# Patient Record
Sex: MALE | Race: BLACK OR AFRICAN AMERICAN | NOT HISPANIC OR LATINO | URBAN - METROPOLITAN AREA
[De-identification: names, ages, dates, MRNs, and addresses within clinical notes are randomized per-mention and may not be internally consistent; named-entity substitution may affect disease eponyms.]

---

## 2024-04-27 ENCOUNTER — LAB REQUISITION (OUTPATIENT)
Dept: LAB | Facility: HOSPITAL | Age: 84
End: 2024-04-27

## 2024-04-27 DIAGNOSIS — R53.1 WEAKNESS: ICD-10-CM

## 2024-04-27 DIAGNOSIS — I48.20 CHRONIC ATRIAL FIBRILLATION, UNSPECIFIED (MULTI): ICD-10-CM

## 2024-04-27 PROCEDURE — 85610 PROTHROMBIN TIME: CPT

## 2024-04-28 LAB
INR PPP: 1.8 (ref 0.9–1.1)
PROTHROMBIN TIME: 20.7 SECONDS (ref 9.8–12.8)

## 2025-02-10 ENCOUNTER — NURSING HOME VISIT (OUTPATIENT)
Dept: POST ACUTE CARE | Facility: EXTERNAL LOCATION | Age: 85
End: 2025-02-10
Payer: MEDICARE

## 2025-02-10 DIAGNOSIS — I50.9 CHRONIC CONGESTIVE HEART FAILURE, UNSPECIFIED HEART FAILURE TYPE: ICD-10-CM

## 2025-02-10 DIAGNOSIS — R53.81 PHYSICAL DECONDITIONING: ICD-10-CM

## 2025-02-10 DIAGNOSIS — I48.11 LONGSTANDING PERSISTENT ATRIAL FIBRILLATION (MULTI): ICD-10-CM

## 2025-02-10 DIAGNOSIS — L89.153 PRESSURE INJURY OF SACRAL REGION, STAGE 3 (MULTI): Primary | ICD-10-CM

## 2025-02-10 DIAGNOSIS — I25.10 CORONARY ARTERY DISEASE INVOLVING NATIVE CORONARY ARTERY OF NATIVE HEART WITHOUT ANGINA PECTORIS: ICD-10-CM

## 2025-02-10 DIAGNOSIS — A04.72 C. DIFFICILE DIARRHEA: ICD-10-CM

## 2025-02-10 DIAGNOSIS — S88.112D BELOW-KNEE AMPUTATION OF LEFT LOWER EXTREMITY, SUBSEQUENT ENCOUNTER (MULTI): ICD-10-CM

## 2025-02-10 DIAGNOSIS — I10 BENIGN ESSENTIAL HTN: ICD-10-CM

## 2025-02-10 DIAGNOSIS — Z79.4 TYPE 2 DIABETES MELLITUS WITHOUT COMPLICATION, WITH LONG-TERM CURRENT USE OF INSULIN (MULTI): ICD-10-CM

## 2025-02-10 DIAGNOSIS — E11.9 TYPE 2 DIABETES MELLITUS WITHOUT COMPLICATION, WITH LONG-TERM CURRENT USE OF INSULIN (MULTI): ICD-10-CM

## 2025-02-10 DIAGNOSIS — I25.2 HX OF MYOCARDIAL INFARCTION: ICD-10-CM

## 2025-02-10 PROCEDURE — 99497 ADVNCD CARE PLAN 30 MIN: CPT | Performed by: INTERNAL MEDICINE

## 2025-02-10 PROCEDURE — 99306 1ST NF CARE HIGH MDM 50: CPT | Performed by: INTERNAL MEDICINE

## 2025-02-10 NOTE — LETTER
Patient: Yonas Gao  : 1940    Encounter Date: 02/10/2025    Nursing Home  History & Physical Visit    Name: Yonas Gao  MRN: [id number]  YOB: 1940  Date of Service: 2/10/2025      History of Present Illness  Pt was seen , available records reviewed. Hx from chart and patient . He was admitted to hosp with worsening in his sacral wound and for his recent L BKA. He had L BKA due to infection in big toe ( as per pt) and has staples on . He had debribdment of stage 3 pressure ulcer in sacral area , orders were given and now he Is here for further management .   Hx of HTN , CHF CAD s/p multiple MI in past.   Lives at home . Feels somewhat down due to recent BKA .     Patient denies any shortness of breath, PND, orthopnea, chest pain , palpitation, syncope or edema in legs  Patient denies any weakness in extremities.. Denies any headache, visual symptoms , speech problems or  tremors . No TIA or stroke like symptoms..        Review of Systems  REVIEW OF SYSTEMS:   All other systems have been reviewed and are negative in relation to patient's complaint and other than what is mentioned in History of present illness.     Vital Signs  Temperature  97.8 °F  02/10/2025 01:13  Pulse  89 per minute  02/10/2025 01:13  Respirations  18 per minute  02/10/2025 01:13  Blood Pressure  133 / 68 mmHg  02/10/2025 01:13  O2 Saturation  97 %  02/10/2025 01:13  Physical Exam  Vitals noted   Not in acute distress  Conj Pink, No icterus  Neck: No Cervical LN enlargement, No Thyroid enlargement   Lungs: good air entry bilaterally, no rales or rhonchi  CVS: S1 S2 + , no S3. No loud heart murmur heard.   Abdomen: Soft, non tender , BS +. no organomegaly , no CVA tenderness  CNS: Pt is alert, moving all 4 extremities. no motor weakness or abnormal movements noted on gross examination.  There is a large, clean looking deep scaral wound in sacral area . No foul smelling or active drainage.   No spine  tenderness  Extremities:  R leg -No edema, No calf tenderness, Amanda's sign negative. L leg has sleeve with L BKA and has staples on. No bleeding or drainage from surgical site.    Assessment/Plan:    1. Pressure injury of sacral region, stage 3 (Multi) -wound care. Had debridement at hosp   2. Below-knee amputation of left lower extremity, subsequent encounter (Multi) - will need f/U with surgeon for staples and further care   3. Longstanding persistent atrial fibrillation (Multi) -hx of. -rate controlled . On coumadin   4. Type 2 diabetes mellitus without complication, with long-term current use of insulin (Multi) -hx of.    5. Coronary artery disease involving native coronary artery of native heart without angina pectoris    6. Chronic congestive heart failure, unspecified heart failure type - stavle   7. Benign essential HTN -hx of. controlled     8. Hx of myocardial infarction    9. C. difficile diarrhea -at hosp. No active diarrhea issue    10. Physical deconditioning -will get therapy        Plan:     Available medical records reviewed . Patient was examined and detailed History and physical done . All questions answered to patient's satisfaction . Total time for chart reviewing , detailed examination and coordinating care with patient was > 35 minutes.   During visit today , I asked patient as well as looked in records  in regards to advanced directive , POA etc. Pt wants to be DNR CCA . He doesnot have any living will currently as per him.  Questions related to it answered to pt's satisfaction .    Patient is doing well.   Continue OT/PT Rehab.   Pain control - he is on oxycodone  F/U with surgeon and wound care   Current medications are effective. advised to continue current medications.  Will continue to follow   Patient felt satisfied with plan  .      Electronically Signed By: García Saul MD   2/10/25 11:17 AM

## 2025-02-10 NOTE — PROGRESS NOTES
Nursing Home  History & Physical Visit    Name: Yonas Gao  MRN: [id number]  YOB: 1940  Date of Service: 2/10/2025      History of Present Illness  Pt was seen , available records reviewed. Hx from chart and patient . He was admitted to hosp with worsening in his sacral wound and for his recent L BKA. He had L BKA due to infection in big toe ( as per pt) and has staples on . He had debribdment of stage 3 pressure ulcer in sacral area , orders were given and now he Is here for further management .   Hx of HTN , CHF CAD s/p multiple MI in past.   Lives at home . Feels somewhat down due to recent BKA .     Patient denies any shortness of breath, PND, orthopnea, chest pain , palpitation, syncope or edema in legs  Patient denies any weakness in extremities.. Denies any headache, visual symptoms , speech problems or  tremors . No TIA or stroke like symptoms..        Review of Systems  REVIEW OF SYSTEMS:   All other systems have been reviewed and are negative in relation to patient's complaint and other than what is mentioned in History of present illness.     Vital Signs  Temperature  97.8 °F  02/10/2025 01:13  Pulse  89 per minute  02/10/2025 01:13  Respirations  18 per minute  02/10/2025 01:13  Blood Pressure  133 / 68 mmHg  02/10/2025 01:13  O2 Saturation  97 %  02/10/2025 01:13  Physical Exam  Vitals noted   Not in acute distress  Conj Pink, No icterus  Neck: No Cervical LN enlargement, No Thyroid enlargement   Lungs: good air entry bilaterally, no rales or rhonchi  CVS: S1 S2 + ,? Extrabeats ,  no S3. No loud heart murmur heard.   Abdomen: Soft, non tender , BS +. no organomegaly , no CVA tenderness  CNS: Pt is alert, moving all 4 extremities. no motor weakness or abnormal movements noted on gross examination.  There is a large, clean looking deep scaral wound in sacral area . No foul smelling or active drainage.   No spine tenderness  Extremities:  R leg -No edema, No calf tenderness, Amanda's sign  negative. L leg has sleeve with L BKA and has staples on. No bleeding or drainage from surgical site.    Assessment/Plan:    1. Pressure injury of sacral region, stage 3 (Multi) -wound care. Had debridement at hosp   2. Below-knee amputation of left lower extremity, subsequent encounter (Multi) - will need f/U with surgeon for staples and further care   3. Longstanding persistent atrial fibrillation (Multi) -hx of. -rate controlled . On coumadin   4. Type 2 diabetes mellitus without complication, with long-term current use of insulin (Multi) -hx of.    5. Coronary artery disease involving native coronary artery of native heart without angina pectoris    6. Chronic congestive heart failure, unspecified heart failure type - stavle   7. Benign essential HTN -hx of. controlled     8. Hx of myocardial infarction    9. C. difficile diarrhea -at hosp. No active diarrhea issue    10. Physical deconditioning -will get therapy        Plan:     Available medical records reviewed . Patient was examined and detailed History and physical done . All questions answered to patient's satisfaction . Total time for chart reviewing , detailed examination and coordinating care with patient was > 35 minutes.   During visit today , I asked patient as well as looked in records  in regards to advanced directive , POA etc. Pt wants to be DNR CCA . He doesnot have any living will currently as per him.  Questions related to it answered to pt's satisfaction .    Patient is doing well.   Continue OT/PT Rehab.   Pain control - he is on oxycodone  F/U with surgeon and wound care   Current medications are effective. advised to continue current medications.  Will continue to follow   Patient felt satisfied with plan  .

## 2025-02-17 ENCOUNTER — NURSING HOME VISIT (OUTPATIENT)
Dept: POST ACUTE CARE | Facility: EXTERNAL LOCATION | Age: 85
End: 2025-02-17
Payer: MEDICARE

## 2025-02-17 DIAGNOSIS — I48.11 LONGSTANDING PERSISTENT ATRIAL FIBRILLATION (MULTI): ICD-10-CM

## 2025-02-17 DIAGNOSIS — I10 BENIGN ESSENTIAL HTN: ICD-10-CM

## 2025-02-17 DIAGNOSIS — S88.112D BELOW-KNEE AMPUTATION OF LEFT LOWER EXTREMITY, SUBSEQUENT ENCOUNTER (MULTI): ICD-10-CM

## 2025-02-17 DIAGNOSIS — L89.153 PRESSURE INJURY OF SACRAL REGION, STAGE 3 (MULTI): Primary | ICD-10-CM

## 2025-02-17 DIAGNOSIS — I50.9 CHRONIC CONGESTIVE HEART FAILURE, UNSPECIFIED HEART FAILURE TYPE: ICD-10-CM

## 2025-02-17 PROCEDURE — 99308 SBSQ NF CARE LOW MDM 20: CPT | Performed by: INTERNAL MEDICINE

## 2025-02-17 NOTE — LETTER
Patient: Yonas Gao  : 1940    Encounter Date: 2025        Nursing home follow up visit  Name: Yonas Gao  MRN: [id number]  YOB: 1940  Date of Service: 2025      Pt was evaluated during nursing home visit today  Patient is doing OK. Participating in therapy well  No sob, cp, PND, orthopnea  Eating ok, sleeping ok   Moving BM ok.   Tolerating medications well    Objective :  Vitals were noted, was examined in therapy room today   Patient is not any acute distress  Conjunctiva- Pink, no icterus   No cervical LN enlargement   Lungs clear, s1s2 +   L BKA with dressing over it.   No edema , No calf tenderness     Assessment:    1. Pressure injury of sacral region, stage 3 (Multi) -getting treatment. Plan as per wound care provider    2. Below-knee amputation of left lower extremity, subsequent encounter (Multi) -dressing , pain control. Gets phantom pain as per pt at times.    3. Longstanding persistent atrial fibrillation (Multi) -hx of.    4. Chronic congestive heart failure, unspecified heart failure type -compensated   5. Benign essential HTN -hx of. controlled          Plan:  Patient is doing well.   Continue OT/PT Rehab.   Pain control  I have reviewed all active medications patient is currently on . Questions related to medication answered to patient's satisfaction.  Current medications are effective. advised to continue current medications.  Will continue to follow   Patient felt satisfied with plan            Electronically Signed By: García Saul MD   25 11:23 AM

## 2025-02-17 NOTE — PROGRESS NOTES
Nursing home follow up visit  Name: Yonas Gao  MRN: [id number]  YOB: 1940  Date of Service: 2/17/2025      Pt was evaluated during nursing home visit today  Patient is doing OK. Participating in therapy well  No sob, cp, PND, orthopnea  Eating ok, sleeping ok   Moving BM ok.   Tolerating medications well    Objective :  Vitals were noted, was examined in therapy room today   Patient is not any acute distress  Conjunctiva- Pink, no icterus   No cervical LN enlargement   Lungs clear, s1s2 +   L BKA with dressing over it.   No edema , No calf tenderness     Assessment:    1. Pressure injury of sacral region, stage 3 (Multi) -getting treatment. Plan as per wound care provider    2. Below-knee amputation of left lower extremity, subsequent encounter (Multi) -dressing , pain control. Gets phantom pain as per pt at times.    3. Longstanding persistent atrial fibrillation (Multi) -hx of.    4. Chronic congestive heart failure, unspecified heart failure type -compensated   5. Benign essential HTN -hx of. controlled          Plan:  Patient is doing well.   Continue OT/PT Rehab.   Pain control  I have reviewed all active medications patient is currently on . Questions related to medication answered to patient's satisfaction.  Current medications are effective. advised to continue current medications.  Will continue to follow   Patient felt satisfied with plan

## 2025-02-24 ENCOUNTER — NURSING HOME VISIT (OUTPATIENT)
Dept: POST ACUTE CARE | Facility: EXTERNAL LOCATION | Age: 85
End: 2025-02-24
Payer: MEDICARE

## 2025-02-24 DIAGNOSIS — L89.153 PRESSURE INJURY OF SACRAL REGION, STAGE 3 (MULTI): ICD-10-CM

## 2025-02-24 DIAGNOSIS — I10 BENIGN ESSENTIAL HTN: ICD-10-CM

## 2025-02-24 DIAGNOSIS — S88.112D BELOW-KNEE AMPUTATION OF LEFT LOWER EXTREMITY, SUBSEQUENT ENCOUNTER (MULTI): ICD-10-CM

## 2025-02-24 DIAGNOSIS — I50.9 CHRONIC CONGESTIVE HEART FAILURE, UNSPECIFIED HEART FAILURE TYPE: Primary | ICD-10-CM

## 2025-02-24 PROCEDURE — 99308 SBSQ NF CARE LOW MDM 20: CPT | Performed by: INTERNAL MEDICINE

## 2025-02-24 NOTE — PROGRESS NOTES
Nursing home follow up visit  Name: Yonas Gao  MRN: [id number]  YOB: 1940  Date of Service: 2/24/2025      Pt was evaluated during nursing home visit today  Patient is doing OK. Participating in therapy well  No sob, cp, PND, orthopnea  Eating ok, sleeping ok   Moving BM ok.   Tolerating medications well    Objective :  Temperature  97.9 °F  02/11/2025 22:01  Pulse  82 per minute  02/11/2025 22:01  Respirations  18 per minute  02/11/2025 22:01  Blood Pressure  122 / 78 mmHg  02/11/2025 22:01  O2 Saturation  99 %  02/11/2025 22:01  Blood Sugar  170 mg/dL / Low  02/10/2025 22:52  Weight  233 lbs / Routine BMI: 29.91  02/21/2025 14:24  Vitals were noted  Patient is not any acute distress  Conjunctiva- Pink, no icterus   No cervical LN enlargement   Lungs clear, s1s2 +   No edema , No calf tenderness s/P L BKA with sleeve on     Assessment:    1. Chronic congestive heart failure, unspecified heart failure type -compensated   2. Pressure injury of sacral region, stage 3 (Multi) -hx of. Wound care   3. Below-knee amputation of left lower extremity, subsequent encounter (Multi)    4. Benign essential HTN         Plan:  Patient is doing well.   Continue OT/PT Rehab.   I have reviewed all active medications patient is currently on . Questions related to medication answered to patient's satisfaction.  Current medications are effective. advised to continue current medications.  Will continue to follow   Patient felt satisfied with plan

## 2025-02-24 NOTE — LETTER
Patient: Yonas Gao  : 1940    Encounter Date: 2025        Nursing home follow up visit  Name: Yonas Gao  MRN: [id number]  YOB: 1940  Date of Service: 2025      Pt was evaluated during nursing home visit today  Patient is doing OK. Participating in therapy well  No sob, cp, PND, orthopnea  Eating ok, sleeping ok   Moving BM ok.   Tolerating medications well    Objective :  Temperature  97.9 °F  2025 22:01  Pulse  82 per minute  2025 22:01  Respirations  18 per minute  2025 22:01  Blood Pressure  122 / 78 mmHg  2025 22:01  O2 Saturation  99 %  2025 22:01  Blood Sugar  170 mg/dL / Low  02/10/2025 22:52  Weight  233 lbs / Routine BMI: 29.91  2025 14:24  Vitals were noted  Patient is not any acute distress  Conjunctiva- Pink, no icterus   No cervical LN enlargement   Lungs clear, s1s2 +   No edema , No calf tenderness s/P L BKA with sleeve on     Assessment:    1. Chronic congestive heart failure, unspecified heart failure type -compensated   2. Pressure injury of sacral region, stage 3 (Multi) -hx of. Wound care   3. Below-knee amputation of left lower extremity, subsequent encounter (Multi)    4. Benign essential HTN         Plan:  Patient is doing well.   Continue OT/PT Rehab.   I have reviewed all active medications patient is currently on . Questions related to medication answered to patient's satisfaction.  Current medications are effective. advised to continue current medications.  Will continue to follow   Patient felt satisfied with plan            Electronically Signed By: García Saul MD   25 10:50 AM

## 2025-03-03 ENCOUNTER — NURSING HOME VISIT (OUTPATIENT)
Dept: POST ACUTE CARE | Facility: EXTERNAL LOCATION | Age: 85
End: 2025-03-03
Payer: MEDICARE

## 2025-03-03 DIAGNOSIS — A04.72 C. DIFFICILE DIARRHEA: ICD-10-CM

## 2025-03-03 DIAGNOSIS — S88.112D BELOW-KNEE AMPUTATION OF LEFT LOWER EXTREMITY, SUBSEQUENT ENCOUNTER (MULTI): ICD-10-CM

## 2025-03-03 DIAGNOSIS — Z75.8 DISCHARGE PLANNING ISSUES: ICD-10-CM

## 2025-03-03 DIAGNOSIS — I10 BENIGN ESSENTIAL HTN: ICD-10-CM

## 2025-03-03 DIAGNOSIS — I25.2 HX OF MYOCARDIAL INFARCTION: ICD-10-CM

## 2025-03-03 DIAGNOSIS — Z79.4 TYPE 2 DIABETES MELLITUS WITHOUT COMPLICATION, WITH LONG-TERM CURRENT USE OF INSULIN (MULTI): ICD-10-CM

## 2025-03-03 DIAGNOSIS — E11.9 TYPE 2 DIABETES MELLITUS WITHOUT COMPLICATION, WITH LONG-TERM CURRENT USE OF INSULIN (MULTI): ICD-10-CM

## 2025-03-03 DIAGNOSIS — I50.9 CHRONIC CONGESTIVE HEART FAILURE, UNSPECIFIED HEART FAILURE TYPE: Primary | ICD-10-CM

## 2025-03-03 DIAGNOSIS — L89.153 PRESSURE INJURY OF SACRAL REGION, STAGE 3 (MULTI): ICD-10-CM

## 2025-03-03 NOTE — PROGRESS NOTES
Nursing home follow up visit  Name: Yonas Gao  MRN: [id number]  YOB: 1940  Date of Service: 3/3/2025      Pt was evaluated during nursing home visit today  Patient is doing OK. Participating in therapy well. He was able to stand for 5 min as per him.   No sob, cp, PND, orthopnea  Eating ok, sleeping ok   Moving BM ok. No more diarrhea   Tolerating medications well    Objective :  Temperature  98.2 °F  03/01/2025 19:52  Pulse  62 per minute  03/01/2025 19:52  Respirations  16 per minute  03/01/2025 19:52  Blood Pressure  139 / 84 mmHg  03/01/2025 19:52  O2 Saturation  99 %  03/01/2025 19:52  Blood Sugar  170 mg/dL / Low  02/10/2025 22:52  Weight  236.0 lbs / Routine BMI: 30.3  02/25/2025 12:02  Vitals were noted  Patient is not any acute distress  Conjunctiva- Pink, no icterus   No cervical LN enlargement   Lungs clear, s1s2 +   No edema , No calf tenderness s/P L BKA     Assessment:    1. Chronic congestive heart failure, unspecified heart failure type -compensated   2. Pressure injury of sacral region, stage 3 (Multi) -getting dressing as per wound care provider's instruction    3. Below-knee amputation of left lower extremity, subsequent encounter (Multi)    4. Benign essential HTN -hx of. controlled     5. Type 2 diabetes mellitus without complication, with long-term current use o  f insulin (Multi) doing ok    6. Discharge planning issues    7. Hx of myocardial infarction -hx of.    8. C. difficile diarrhea -hx of. Doing ok now         Plan:  Patient is doing well.   Patient was discussed about her medical issues, medications questions as well as follow up with her doctor upon discharge. All questions were answered to her satisfaction . She is ready for discharge soon in my clinical judgement.. Total time discussing it along with co ordination of care was > 25 min. He will be moved to assisted living place in [facility].     I have reviewed all active medications patient is currently on .  Questions related to medication answered to patient's satisfaction.  Current medications are effective. advised to continue current medications.  Will continue to follow   Patient felt satisfied with plan

## 2025-03-03 NOTE — LETTER
Patient: Yonas Gao  : 1940    Encounter Date: 2025        Nursing home follow up visit  Name: Yonas Gao  MRN: [id number]  YOB: 1940  Date of Service: 3/3/2025      Pt was evaluated during nursing home visit today  Patient is doing OK. Participating in therapy well. He was able to stand for 5 min as per him.   No sob, cp, PND, orthopnea  Eating ok, sleeping ok   Moving BM ok. No more diarrhea   Tolerating medications well    Objective :  Temperature  98.2 °F  2025 19:52  Pulse  62 per minute  2025 19:52  Respirations  16 per minute  2025 19:52  Blood Pressure  139 / 84 mmHg  2025 19:52  O2 Saturation  99 %  2025 19:52  Blood Sugar  170 mg/dL / Low  02/10/2025 22:52  Weight  236.0 lbs / Routine BMI: 30.3  2025 12:02  Vitals were noted  Patient is not any acute distress  Conjunctiva- Pink, no icterus   No cervical LN enlargement   Lungs clear, s1s2 +   No edema , No calf tenderness s/P L BKA     Assessment:    1. Chronic congestive heart failure, unspecified heart failure type -compensated   2. Pressure injury of sacral region, stage 3 (Multi) -getting dressing as per wound care provider's instruction    3. Below-knee amputation of left lower extremity, subsequent encounter (Multi)    4. Benign essential HTN -hx of. controlled     5. Type 2 diabetes mellitus without complication, with long-term current use o  f insulin (Multi) doing ok    6. Discharge planning issues    7. Hx of myocardial infarction -hx of.    8. C. difficile diarrhea -hx of. Doing ok now         Plan:  Patient is doing well.   Patient was discussed about her medical issues, medications questions as well as follow up with her doctor upon discharge. All questions were answered to her satisfaction . She is ready for discharge soon in my clinical judgement.. Total time discussing it along with co ordination of care was > 25 min. He will be moved to assisted living place in [place]  area.     I have reviewed all active medications patient is currently on . Questions related to medication answered to patient's satisfaction.  Current medications are effective. advised to continue current medications.  Will continue to follow   Patient felt satisfied with plan            Electronically Signed By: García Saul MD   3/3/25 11:03 AM